# Patient Record
Sex: FEMALE | URBAN - METROPOLITAN AREA
[De-identification: names, ages, dates, MRNs, and addresses within clinical notes are randomized per-mention and may not be internally consistent; named-entity substitution may affect disease eponyms.]

---

## 2017-03-21 ENCOUNTER — APPOINTMENT (OUTPATIENT)
Dept: URBAN - METROPOLITAN AREA CLINIC 282 | Age: 67
Setting detail: DERMATOLOGY
End: 2017-03-21

## 2017-03-21 DIAGNOSIS — Z71.89 OTHER SPECIFIED COUNSELING: ICD-10-CM

## 2017-03-21 DIAGNOSIS — D22 MELANOCYTIC NEVI: ICD-10-CM

## 2017-03-21 DIAGNOSIS — L57.0 ACTINIC KERATOSIS: ICD-10-CM

## 2017-03-21 DIAGNOSIS — Z12.83 ENCOUNTER FOR SCREENING FOR MALIGNANT NEOPLASM OF SKIN: ICD-10-CM

## 2017-03-21 DIAGNOSIS — D18.0 HEMANGIOMA: ICD-10-CM

## 2017-03-21 DIAGNOSIS — L73.8 OTHER SPECIFIED FOLLICULAR DISORDERS: ICD-10-CM

## 2017-03-21 PROBLEM — D18.01 HEMANGIOMA OF SKIN AND SUBCUTANEOUS TISSUE: Status: ACTIVE | Noted: 2017-03-21

## 2017-03-21 PROBLEM — D22.71 MELANOCYTIC NEVI OF RIGHT LOWER LIMB, INCLUDING HIP: Status: ACTIVE | Noted: 2017-03-21

## 2017-03-21 PROBLEM — D22.5 MELANOCYTIC NEVI OF TRUNK: Status: ACTIVE | Noted: 2017-03-21

## 2017-03-21 PROBLEM — D22.62 MELANOCYTIC NEVI OF LEFT UPPER LIMB, INCLUDING SHOULDER: Status: ACTIVE | Noted: 2017-03-21

## 2017-03-21 PROCEDURE — 99214 OFFICE O/P EST MOD 30 MIN: CPT | Mod: 25

## 2017-03-21 PROCEDURE — OTHER COUNSELING: OTHER

## 2017-03-21 PROCEDURE — OTHER REASSURANCE: OTHER

## 2017-03-21 PROCEDURE — OTHER LIQUID NITROGEN: OTHER

## 2017-03-21 PROCEDURE — OTHER OBSERVATION: OTHER

## 2017-03-21 PROCEDURE — 17000 DESTRUCT PREMALG LESION: CPT

## 2017-03-21 PROCEDURE — OTHER OBSERVATION AND MEASURE: OTHER

## 2017-03-21 ASSESSMENT — LOCATION DETAILED DESCRIPTION DERM
LOCATION DETAILED: NASAL SUPRATIP
LOCATION DETAILED: RIGHT UPPER CUTANEOUS LIP
LOCATION DETAILED: RIGHT INFERIOR MEDIAL UPPER BACK
LOCATION DETAILED: LEFT MEDIAL SUPERIOR CHEST
LOCATION DETAILED: RIGHT UPPER CUTANEOUS LIP
LOCATION DETAILED: RIGHT DISTAL POSTERIOR THIGH
LOCATION DETAILED: LEFT ANTECUBITAL SKIN
LOCATION DETAILED: SUPERIOR THORACIC SPINE
LOCATION DETAILED: INFERIOR THORACIC SPINE

## 2017-03-21 ASSESSMENT — LOCATION ZONE DERM
LOCATION ZONE: LIP
LOCATION ZONE: TRUNK
LOCATION ZONE: LEG
LOCATION ZONE: NOSE
LOCATION ZONE: ARM
LOCATION ZONE: LIP

## 2017-03-21 ASSESSMENT — LOCATION SIMPLE DESCRIPTION DERM
LOCATION SIMPLE: RIGHT LIP
LOCATION SIMPLE: RIGHT POSTERIOR THIGH
LOCATION SIMPLE: LEFT UPPER ARM
LOCATION SIMPLE: RIGHT UPPER BACK
LOCATION SIMPLE: UPPER BACK
LOCATION SIMPLE: RIGHT LIP
LOCATION SIMPLE: CHEST
LOCATION SIMPLE: NOSE

## 2017-03-21 NOTE — PROCEDURE: LIQUID NITROGEN
Consent: The patient's consent was obtained including but not limited to risks of crusting, scabbing, blistering, scarring, darker or lighter pigmentary change, recurrence, incomplete removal and infection.
Render Post-Care Instructions In Note?: no
Duration Of Freeze Thaw-Cycle (Seconds): 10
Detail Level: Simple
Number Of Freeze-Thaw Cycles: 1 freeze-thaw cycle
Post-Care Instructions: I reviewed with the patient in detail post-care instructions. Patient is to wear sunprotection, and avoid picking at any of the treated lesions. Pt may apply Vaseline to crusted or scabbing areas.

## 2017-11-07 ENCOUNTER — FOLLOW UP ESTABLISHED (OUTPATIENT)
Dept: URBAN - METROPOLITAN AREA CLINIC 30 | Facility: CLINIC | Age: 67
End: 2017-11-07
Payer: MEDICARE

## 2017-11-07 DIAGNOSIS — R73.09 OTHER ABNORMAL GLUCOSE: Primary | ICD-10-CM

## 2017-11-07 PROCEDURE — 92014 COMPRE OPH EXAM EST PT 1/>: CPT | Performed by: OPTOMETRIST

## 2017-11-07 PROCEDURE — 92250 FUNDUS PHOTOGRAPHY W/I&R: CPT | Performed by: OPTOMETRIST

## 2017-11-07 ASSESSMENT — VISUAL ACUITY
OD: 20/25
OS: 20/25

## 2017-11-07 ASSESSMENT — INTRAOCULAR PRESSURE
OD: 12
OS: 14

## 2017-11-07 ASSESSMENT — KERATOMETRY
OS: 46.46
OD: 45.70

## 2019-03-13 ENCOUNTER — FOLLOW UP ESTABLISHED (OUTPATIENT)
Dept: URBAN - METROPOLITAN AREA CLINIC 30 | Facility: CLINIC | Age: 69
End: 2019-03-13
Payer: MEDICARE

## 2019-03-13 DIAGNOSIS — H04.123 DRY EYE SYNDROME OF BILATERAL LACRIMAL GLANDS: ICD-10-CM

## 2019-03-13 PROCEDURE — 92134 CPTRZ OPH DX IMG PST SGM RTA: CPT | Performed by: OPTOMETRIST

## 2019-03-13 PROCEDURE — 92015 DETERMINE REFRACTIVE STATE: CPT | Performed by: OPTOMETRIST

## 2019-03-13 PROCEDURE — 92014 COMPRE OPH EXAM EST PT 1/>: CPT | Performed by: OPTOMETRIST

## 2019-03-13 ASSESSMENT — KERATOMETRY
OS: 46.52
OD: 45.98

## 2019-03-13 ASSESSMENT — INTRAOCULAR PRESSURE
OS: 12
OD: 15

## 2019-03-13 ASSESSMENT — VISUAL ACUITY
OD: 20/25
OS: 20/30

## 2020-06-16 ENCOUNTER — FOLLOW UP ESTABLISHED (OUTPATIENT)
Dept: URBAN - METROPOLITAN AREA CLINIC 30 | Facility: CLINIC | Age: 70
End: 2020-06-16
Payer: MEDICARE

## 2020-06-16 DIAGNOSIS — H18.51 ENDOTHELIAL CORNEAL DYSTROPHY: ICD-10-CM

## 2020-06-16 DIAGNOSIS — H25.813 COMBINED FORMS OF AGE-RELATED CATARACT, BILATERAL: ICD-10-CM

## 2020-06-16 PROCEDURE — 92134 CPTRZ OPH DX IMG PST SGM RTA: CPT | Performed by: OPTOMETRIST

## 2020-06-16 PROCEDURE — 92014 COMPRE OPH EXAM EST PT 1/>: CPT | Performed by: OPTOMETRIST

## 2020-06-16 ASSESSMENT — INTRAOCULAR PRESSURE
OS: 14
OD: 15

## 2020-06-16 ASSESSMENT — VISUAL ACUITY
OS: 20/50
OD: 20/40

## 2020-06-16 ASSESSMENT — KERATOMETRY
OD: 46.05
OS: 46.66

## 2020-07-21 ENCOUNTER — FOLLOW UP ESTABLISHED (OUTPATIENT)
Dept: URBAN - METROPOLITAN AREA CLINIC 30 | Facility: CLINIC | Age: 70
End: 2020-07-21
Payer: MEDICARE

## 2020-07-21 PROCEDURE — 99213 OFFICE O/P EST LOW 20 MIN: CPT | Performed by: OPTOMETRIST

## 2020-07-21 PROCEDURE — 92015 DETERMINE REFRACTIVE STATE: CPT | Performed by: OPTOMETRIST

## 2020-07-21 RX ORDER — CARBOXYMETHYLCELLULOSE SODIUM 5 MG/ML
0.5 % SOLUTION/ DROPS OPHTHALMIC
Qty: 0 | Refills: 0 | Status: ACTIVE
Start: 2020-07-21

## 2020-07-21 ASSESSMENT — KERATOMETRY
OS: 260.09
OD: 46.12

## 2020-07-21 ASSESSMENT — VISUAL ACUITY
OD: 20/25
OS: 20/40

## 2020-07-21 ASSESSMENT — INTRAOCULAR PRESSURE
OD: 10
OS: 10

## 2021-08-27 ENCOUNTER — OFFICE VISIT (OUTPATIENT)
Dept: URBAN - METROPOLITAN AREA CLINIC 30 | Facility: CLINIC | Age: 71
End: 2021-08-27
Payer: OTHER MISCELLANEOUS

## 2021-08-27 DIAGNOSIS — H18.513 ENDOTHELIAL CORNEAL DYSTROPHY, BILATERAL: ICD-10-CM

## 2021-08-27 DIAGNOSIS — H43.393 OTHER VITREOUS OPACITIES, BILATERAL: ICD-10-CM

## 2021-08-27 PROCEDURE — 76514 ECHO EXAM OF EYE THICKNESS: CPT | Performed by: OPTOMETRIST

## 2021-08-27 PROCEDURE — 92134 CPTRZ OPH DX IMG PST SGM RTA: CPT | Performed by: OPTOMETRIST

## 2021-08-27 PROCEDURE — 92014 COMPRE OPH EXAM EST PT 1/>: CPT | Performed by: OPTOMETRIST

## 2021-08-27 ASSESSMENT — INTRAOCULAR PRESSURE
OD: 16
OS: 16

## 2021-08-27 ASSESSMENT — VISUAL ACUITY
OD: 20/30
OS: 20/30

## 2021-08-27 ASSESSMENT — KERATOMETRY
OD: 45.91
OS: 46.69

## 2021-08-27 NOTE — IMPRESSION/PLAN
Impression: Other abnormal glucose Plan: No retinopathy on exam today. Patient educated on importance of BG control. Monitor annually. Diet controlled.

## 2021-08-27 NOTE — IMPRESSION/PLAN
Impression: Fuchs' dystrophy ; OU
PACHS 534/535 Plan: Appears stable. Denies blur in am. Ari Ulrich discussed previously. Pt did not try. Discussed again today and recommend starting qhs OU. Previously discussed possible slower recovery c cataract surgery. Previously discussed tx options should condition advances.

## 2021-08-27 NOTE — IMPRESSION/PLAN
Impression: Combined forms of age-related cataract, bilateral Plan: Continue to monitor. Will continue c current spec Rx.

## 2021-08-27 NOTE — IMPRESSION/PLAN
Impression: Tear film insufficiency of bilateral lacrimal glands Plan: Pt uses ATs prn. Notes intermittent stinging. Rec consistent use of ATs qam and throughout the day prn. Discussed staying hydrated, using good quality fish oil, using humidifier. Systane sample given to pt today. PRN brand info given to pt.  Contact office if NI.

## 2022-08-30 ENCOUNTER — OFFICE VISIT (OUTPATIENT)
Dept: URBAN - METROPOLITAN AREA CLINIC 30 | Facility: CLINIC | Age: 72
End: 2022-08-30
Payer: OTHER MISCELLANEOUS

## 2022-08-30 DIAGNOSIS — H25.813 COMBINED FORMS OF AGE-RELATED CATARACT, BILATERAL: ICD-10-CM

## 2022-08-30 DIAGNOSIS — H18.513 ENDOTHELIAL CORNEAL DYSTROPHY, BILATERAL: ICD-10-CM

## 2022-08-30 DIAGNOSIS — H43.393 OTHER VITREOUS OPACITIES, BILATERAL: ICD-10-CM

## 2022-08-30 DIAGNOSIS — H04.123 DRY EYE SYNDROME OF BILATERAL LACRIMAL GLANDS: Primary | ICD-10-CM

## 2022-08-30 DIAGNOSIS — R73.09 OTHER ABNORMAL GLUCOSE: ICD-10-CM

## 2022-08-30 PROCEDURE — 92014 COMPRE OPH EXAM EST PT 1/>: CPT | Performed by: OPTOMETRIST

## 2022-08-30 PROCEDURE — 92134 CPTRZ OPH DX IMG PST SGM RTA: CPT | Performed by: OPTOMETRIST

## 2022-08-30 ASSESSMENT — VISUAL ACUITY
OS: 20/25
OD: 20/30

## 2022-08-30 ASSESSMENT — INTRAOCULAR PRESSURE
OD: 14
OS: 13

## 2022-08-30 ASSESSMENT — KERATOMETRY
OS: 46.59
OD: 45.92

## 2022-08-30 NOTE — IMPRESSION/PLAN
Impression: Tear film insufficiency of bilateral lacrimal glands Plan: Pt is rarely using ATs noting overall comfort. Discontinued fish oil due to attributing it to higher A1c. Rec ATs QD-BID OU.

## 2022-08-30 NOTE — IMPRESSION/PLAN
Impression: Other vitreous opacities, bilateral Plan: Stable. Retinas are flat and attached OU. MAC OCT WNL OU.

## 2022-08-30 NOTE — IMPRESSION/PLAN
Impression: Combined forms of age-related cataract, bilateral Plan: Continue to monitor. Will continue c current spec Rx, rx generated.

## 2022-08-30 NOTE — IMPRESSION/PLAN
Impression: Fuchs' dystrophy ; OU
PACHS 534/535 Plan: Appears stable. Denies blur in am. Efra Moreira discussed previously. Pt did not try. Previously discussed possible slower recovery c cataract surgery. Previously discussed tx options should condition advances.

## 2023-09-05 ENCOUNTER — OFFICE VISIT (OUTPATIENT)
Dept: URBAN - METROPOLITAN AREA CLINIC 30 | Facility: CLINIC | Age: 73
End: 2023-09-05
Payer: OTHER MISCELLANEOUS

## 2023-09-05 DIAGNOSIS — H25.813 COMBINED FORMS OF AGE-RELATED CATARACT, BILATERAL: ICD-10-CM

## 2023-09-05 DIAGNOSIS — H52.4 PRESBYOPIA: ICD-10-CM

## 2023-09-05 DIAGNOSIS — H04.123 DRY EYE SYNDROME OF BILATERAL LACRIMAL GLANDS: ICD-10-CM

## 2023-09-05 DIAGNOSIS — R73.09 OTHER ABNORMAL GLUCOSE: Primary | ICD-10-CM

## 2023-09-05 DIAGNOSIS — H43.393 OTHER VITREOUS OPACITIES, BILATERAL: ICD-10-CM

## 2023-09-05 DIAGNOSIS — H18.513 ENDOTHELIAL CORNEAL DYSTROPHY, BILATERAL: ICD-10-CM

## 2023-09-05 PROCEDURE — 92134 CPTRZ OPH DX IMG PST SGM RTA: CPT | Performed by: OPTOMETRIST

## 2023-09-05 PROCEDURE — 92014 COMPRE OPH EXAM EST PT 1/>: CPT | Performed by: OPTOMETRIST

## 2023-09-05 ASSESSMENT — INTRAOCULAR PRESSURE
OS: 15
OD: 15

## 2023-09-05 ASSESSMENT — VISUAL ACUITY
OD: 20/30
OS: 20/30

## 2023-09-05 ASSESSMENT — KERATOMETRY
OS: 46.46
OD: 45.85

## 2024-09-05 ENCOUNTER — OFFICE VISIT (OUTPATIENT)
Dept: URBAN - METROPOLITAN AREA CLINIC 30 | Facility: CLINIC | Age: 74
End: 2024-09-05
Payer: OTHER MISCELLANEOUS

## 2024-09-05 DIAGNOSIS — H18.513 ENDOTHELIAL CORNEAL DYSTROPHY, BILATERAL: ICD-10-CM

## 2024-09-05 DIAGNOSIS — H43.393 OTHER VITREOUS OPACITIES, BILATERAL: ICD-10-CM

## 2024-09-05 DIAGNOSIS — H25.813 COMBINED FORMS OF AGE-RELATED CATARACT, BILATERAL: Primary | ICD-10-CM

## 2024-09-05 DIAGNOSIS — R73.09 OTHER ABNORMAL GLUCOSE: ICD-10-CM

## 2024-09-05 PROCEDURE — 92134 CPTRZ OPH DX IMG PST SGM RTA: CPT | Performed by: OPTOMETRIST

## 2024-09-05 PROCEDURE — 92014 COMPRE OPH EXAM EST PT 1/>: CPT | Performed by: OPTOMETRIST

## 2024-09-05 ASSESSMENT — KERATOMETRY
OS: 46.46
OD: 45.85

## 2024-09-05 ASSESSMENT — INTRAOCULAR PRESSURE
OS: 14
OD: 16

## 2024-09-05 ASSESSMENT — VISUAL ACUITY
OD: 20/30
OS: 20/40